# Patient Record
Sex: FEMALE | Race: WHITE | NOT HISPANIC OR LATINO | Employment: FULL TIME | URBAN - METROPOLITAN AREA
[De-identification: names, ages, dates, MRNs, and addresses within clinical notes are randomized per-mention and may not be internally consistent; named-entity substitution may affect disease eponyms.]

---

## 2021-08-03 ENCOUNTER — CLINICAL SUPPORT (OUTPATIENT)
Dept: OTHER | Facility: HOSPITAL | Age: 31
End: 2021-08-03

## 2021-08-03 ENCOUNTER — LAB (OUTPATIENT)
Dept: LAB | Facility: HOSPITAL | Age: 31
End: 2021-08-03

## 2021-08-03 DIAGNOSIS — Z80.8 FAMILY HISTORY OF MALIGNANT NEOPLASM OF THYROID: ICD-10-CM

## 2021-08-03 DIAGNOSIS — Z83.49 FAMILY HISTORY OF THYROID NODULE: ICD-10-CM

## 2021-08-03 DIAGNOSIS — Z13.79 GENETIC TESTING: Primary | ICD-10-CM

## 2021-08-03 DIAGNOSIS — Z80.0 FAMILY HISTORY OF PANCREATIC CANCER: ICD-10-CM

## 2021-08-03 DIAGNOSIS — Z80.0: ICD-10-CM

## 2021-08-03 DIAGNOSIS — Z80.0 FAMILY HISTORY OF COLON CANCER: ICD-10-CM

## 2021-08-03 PROCEDURE — 96040: CPT | Performed by: GENETIC COUNSELOR, MS

## 2021-08-03 NOTE — PROGRESS NOTES
Nereyda Marin, a 30-year-old female, was referred for genetic counseling due to a suspected family history of a mutation in the RET gene. The patient's sister was previously seen for the same referral and it was determined no family members have actually had genetic testing up to this point. Ms. Marin's sister consented to me sharing family and medical history with Ms. Marin to aid in risk assessment and guide our discussion. Ms. Marin has no personal cancer history. Based on her interest in being evaluated for hereditary risk for cancer, Ms. Marin opted to pursue a multi-gene panel. Therefore, the Multi-Cancer Panel through [facility] was ordered, which evaluates 84 genes associated with an increased risk for cancer. Results are expected in 2-3 weeks.     PERTINENT FAMILY HISTORY: (See attached pedigree)   Maternal aunt:   Pancreatic cancer, 61  Maternal uncle:  Metastatic thyroid cancer (unknown type), late 30s  Maternal uncle: Thyroid cancer (unknown type), 40s  Maternal uncle:  Leukemia, 40s     Bile duct (liver) cancer, 57  Maternal grandmother:  Colon cancer, 70s  Mother:   Thyroid nodules  Maternal aunt:  Thyroid removed/unconfirmed cancer  Maternal aunt:   Thyroid nodules    We do not have medical records regarding any of these diagnoses.  Ms. Marin’s relatives with cancer have all declined genetic testing up to this point.     RISK ASSESSMENT:  Ms. Marin’s family history raises concern for a hereditary cancer syndrome. NCCN guidelines state that any individual with a family history of 3 or more relatives with a Bergman syndrome-related cancer may be considered for genetic testing. Ms. Marin’s maternal grandmother with colon cancer, maternal aunt with pancreatic cancer, and maternal uncle with a biliary tract cancer indicate she would meet criteria for this testing. Ms. Marin also meets criteria for testing for BRCA1/2 due to her family history of pancreatic cancer. These risk assessments are based on the  family history information provided at the time of the appointment and could change in the future should new information be obtained.    GENETIC COUNSELING (30 minutes):  We reviewed the family history information in detail. Cases of cancer follow three general patterns: sporadic, familial, and hereditary.  While most cancer is sporadic, some cases appear to occur in family clusters.  These cases are said to be familial and account for 10-20% of cancer cases.  Familial cases may be due to a combination of shared genes and environmental factors among family members.  In even fewer families, the cancer is said to be inherited, and the genes responsible for the cancer are known.      Family histories typical of hereditary cancer syndromes usually include multiple first- and second-degree relatives diagnosed with cancer types that define a syndrome.  These cases tend to be diagnosed at younger-than-expected ages and can be bilateral or multifocal.  The cancer in these families follows an autosomal dominant inheritance pattern, which indicates the likely presence of a mutation in a cancer susceptibility gene.  Children and siblings of an individual believed to carry this mutation have a 50% chance of inheriting that mutation, thereby inheriting the increased risk to develop cancer.  These mutations can be passed down from the maternal or the paternal lineage.    Given the family history of thyroid cancer, concern had been raised for Multiple Endocrine Neoplasia Type 2 (MEN2).  MEN2 is classified into three subtypes based on the additional features identified within a family. All three subtypes are associated with an increased risk for medullary thyroid cancer. Individuals with MEN2A have an approximately 95% lifetime risk of MTC with the typical age of onset in early adulthood. MEN2A is also associated with an approximate 50% lifetime risk for pheochromocytomas, which usually present with intractable hypertension.  They  can be bilateral and have about a 4% risk for malignant transformation. Finally, 20-30% of patients with MEN2A develop parathyroid disease.  Parathyroid abnormalities can range from benign parathyroid adenomas to clinically evident hyperparathyroidism with hypercalcemia and renal stones. MEN2 is caused by mutations within the RET moreno-oncogene.  Genotype/phenotype correlations do exist (i.e. particular mutations within the RET gene are associated with particular risks) and may be used for specific risk assessment.  Prophylactic thyroidectomy is the primary preventative measure for MEN2.  Annual biochemical screening for pheochromocytomas and parathyroid disease is also recommended in affected individuals.  Regardless, screening for pheochromocytomas is recommended prior to any surgery due to the risk an undetected pheochromocytoma would pose for an anesthesia-induced hypertensive crisis.    There are other genes that are known to be associated with an increased risk for cancer.  For example, Ms. Marin meets criteria for both Bergman syndrome and Hereditary Breast and Ovarian Cancer (HBOC). If Ms. Marin is found to have a mutation in a gene associated with either one of these syndromes, then there are national management guidelines to follow for various screening and management recommendations. The recommendations and degree of risk vary based on the underlying genetic mutation.  We discussed there are other genes associated with increased risk for these other cancers we see presenting in her family. Based on Ms. Marin’s desire to get as much information as possible regarding her personal risks and potential risks for her family, she opted to pursue testing through a panel that would look at several other genes known to increase the risk for cancer.    GENETIC TESTING:  The risks, benefits and limitations of genetic testing and implications for clinical management following testing were reviewed.  DNA test results can  influence decisions regarding screening, prevention and surgical management.  Genetic testing can have significant psychological implications for both individuals and families.  Also discussed was the possibility of employment and insurance discrimination based on genetic test results and the laws in place to prevent this.    We discussed panel testing, which would involve testing for RET, as well as 83 additional genes that are associated with increased cancer risk. The benefits and limitations of genetic testing were discussed and Ms. Marin decided to pursue testing via the panel. The implications of a positive or negative test result were discussed. We discussed the possibility that, in some cases, genetic test results may be uninformative or may be ambiguous due to the identification of a genetic variant. These variants may or may not be associated with an increased cancer risk.  With multigene panel testing, it is not uncommon for a variant of uncertain significance (VUS) to be identified.  If a VUS is identified, testing family members is typically not recommended and screening recommendations are made based on the family history.  The laboratories that perform genetic testing work to reclassify the VUS and send out an amended report if and when a VUS is reclassified.  The majority of variant findings are ultimately reclassified to a negative result.  Given Ms. Marin’s family history, a negative test result would not eliminate the chance that there is a genetic mutation being passed through the family that she did not inherit. If a family member affected by cancer has genetic testing performed, it may help to interpret the results of Ms. Marin’s genetic testing (i.e. if a family member tests positive, a negative result for Ms. Marin would be a true negative.)     PLAN: Genetic testing via the Multi-Cancer Panel through [facility] was ordered and results are expected in 2-3 weeks.  Ms. Marin is welcome to  contact us in the meantime at [phone] with any questions she may have.      Nayana Shah MS  Genetic Counselor

## 2021-08-11 ENCOUNTER — DOCUMENTATION (OUTPATIENT)
Dept: OTHER | Facility: CLINIC | Age: 31
End: 2021-08-11

## 2021-08-11 NOTE — PROGRESS NOTES
Nereyda Marin, a 30-year-old female, was referred for genetic counseling due to a suspected family history of a mutation in the RET gene. The patient's sister was previously seen for the same referral and it was determined no family members have actually had genetic testing up to this point. Ms. Marin's sister consented to me sharing family and medical history with Ms. Marin to aid in risk assessment and guide our discussion. Ms. Marin has no personal cancer history. Based on her interest in being evaluated for hereditary risk for cancer, Ms. Marin opted to pursue a multi-gene panel. Therefore, the Multi-Cancer Panel through [facility] was ordered, which evaluates 84 genes associated with an increased risk for cancer. Testing was negative for any known pathogenic/deleterious mutations in the 84 genes analyzed.  While no deleterious mutations were identified, one variant of uncertain significance (VUS) was identified in the POLE gene. Variants are differences in DNA that may or may not affect the function of the gene. The classification of a variant to either a benign variant or deleterious mutation depends on a number of factors. The majority (estimated to be >90%) of VUS’s are eventually reclassified as benign variation, therefore this result should not be used in making medical management decisions. It is not recommended that unaffected relatives be tested for a VUS. ClinVar, the NIH variant classification database, was referenced and there are no alternative interpretations of the POLE variant at this time. Results were discussed via telephone on 08/11/2021.     PERTINENT FAMILY HISTORY: (See attached pedigree)   Maternal aunt:             Pancreatic cancer, 61  Maternal uncle:           Metastatic thyroid cancer (unknown type), late 30s  Maternal uncle:           Thyroid cancer (unknown type), 40s  Maternal uncle:           Leukemia, 40s                                      Bile duct (liver) cancer,  57  Maternal grandmother:  Colon cancer, 70s  Mother:                        Thyroid nodules  Maternal aunt:             Thyroid removed/unconfirmed cancer  Maternal aunt:             Thyroid nodules     We do not have medical records regarding any of these diagnoses.  Ms. Marin’s relatives with cancer have all declined genetic testing up to this point.      RISK ASSESSMENT:  Ms. Marin’s family history raises concern for a hereditary cancer syndrome. NCCN guidelines state that any individual with a family history of 3 or more relatives with a Bergman syndrome-related cancer may be considered for genetic testing. Ms. Marin’s maternal grandmother with colon cancer, maternal aunt with pancreatic cancer, and maternal uncle with a biliary tract cancer indicate she would meet criteria for this testing. Ms. Marin also meets criteria for testing for BRCA1/2 due to her family history of pancreatic cancer. These risk assessments are based on the family history information provided at the time of the appointment and could change in the future should new information be obtained.     GENETIC COUNSELING (30 minutes):  We reviewed the family history information in detail. Cases of cancer follow three general patterns: sporadic, familial, and hereditary.  While most cancer is sporadic, some cases appear to occur in family clusters.  These cases are said to be familial and account for 10-20% of cancer cases.  Familial cases may be due to a combination of shared genes and environmental factors among family members.  In even fewer families, the cancer is said to be inherited, and the genes responsible for the cancer are known.       Family histories typical of hereditary cancer syndromes usually include multiple first- and second-degree relatives diagnosed with cancer types that define a syndrome.  These cases tend to be diagnosed at younger-than-expected ages and can be bilateral or multifocal.  The cancer in these families follows an  autosomal dominant inheritance pattern, which indicates the likely presence of a mutation in a cancer susceptibility gene.  Children and siblings of an individual believed to carry this mutation have a 50% chance of inheriting that mutation, thereby inheriting the increased risk to develop cancer.  These mutations can be passed down from the maternal or the paternal lineage.     Given the family history of thyroid cancer, concern had been raised for Multiple Endocrine Neoplasia Type 2 (MEN2).  MEN2 is classified into three subtypes based on the additional features identified within a family. All three subtypes are associated with an increased risk for medullary thyroid cancer. Individuals with MEN2A have an approximately 95% lifetime risk of MTC with the typical age of onset in early adulthood. MEN2A is also associated with an approximate 50% lifetime risk for pheochromocytomas, which usually present with intractable hypertension.  They can be bilateral and have about a 4% risk for malignant transformation. Finally, 20-30% of patients with MEN2A develop parathyroid disease.  Parathyroid abnormalities can range from benign parathyroid adenomas to clinically evident hyperparathyroidism with hypercalcemia and renal stones. MEN2 is caused by mutations within the RET moreno-oncogene.  Genotype/phenotype correlations do exist (i.e. particular mutations within the RET gene are associated with particular risks) and may be used for specific risk assessment.  Prophylactic thyroidectomy is the primary preventative measure for MEN2.  Annual biochemical screening for pheochromocytomas and parathyroid disease is also recommended in affected individuals.  Regardless, screening for pheochromocytomas is recommended prior to any surgery due to the risk an undetected pheochromocytoma would pose for an anesthesia-induced hypertensive crisis.     There are other genes that are known to be associated with an increased risk for cancer.   For example, Ms. Marin meets criteria for both Bergman syndrome and Hereditary Breast and Ovarian Cancer (HBOC). If Ms. Marin is found to have a mutation in a gene associated with either one of these syndromes, then there are national management guidelines to follow for various screening and management recommendations. The recommendations and degree of risk vary based on the underlying genetic mutation.  We discussed there are other genes associated with increased risk for these other cancers we see presenting in her family. Based on Ms. Marin’s desire to get as much information as possible regarding her personal risks and potential risks for her family, she opted to pursue testing through a panel that would look at several other genes known to increase the risk for cancer.     GENETIC TESTING:  The risks, benefits and limitations of genetic testing and implications for clinical management following testing were reviewed.  DNA test results can influence decisions regarding screening, prevention and surgical management.  Genetic testing can have significant psychological implications for both individuals and families.  Also discussed was the possibility of employment and insurance discrimination based on genetic test results and the laws in place to prevent this.     We discussed panel testing, which would involve testing for RET, as well as 83 additional genes that are associated with increased cancer risk. The benefits and limitations of genetic testing were discussed and Ms. Marin decided to pursue testing via the panel. The implications of a positive or negative test result were discussed. We discussed the possibility that, in some cases, genetic test results may be uninformative or may be ambiguous due to the identification of a genetic variant. These variants may or may not be associated with an increased cancer risk.  With multigene panel testing, it is not uncommon for a variant of uncertain significance (VUS)  to be identified.  If a VUS is identified, testing family members is typically not recommended and screening recommendations are made based on the family history.  The laboratories that perform genetic testing work to reclassify the VUS and send out an amended report if and when a VUS is reclassified.  The majority of variant findings are ultimately reclassified to a negative result.  Given Ms. Marin’s family history, a negative test result would not eliminate the chance that there is a genetic mutation being passed through the family that she did not inherit. If a family member affected by cancer has genetic testing performed, it may help to interpret the results of Ms. Marin’s genetic testing (i.e. if a family member tests positive, a negative result for Ms. Marin would be a true negative.)      TEST RESULTS:  Genetic testing was negative for known pathogenic mutations by sequencing and rearrangement testing for the 84 genes on this panel. A variant of uncertain significance (VUS) was identified in the POLE gene. VUSs are differences within a gene that may or may not be of clinical significance.  VUSs are frequently reported with multigene panel testing, given the number of genes being evaluated.  The majority of VUS’s are ultimately reclassified as benign, therefore these results should not be used in making management decisions.  If the VUS is ever reclassified a new report will be issued by the laboratory and released directly to the ordering physician, and our office. Since an affected relative has not had genetic testing, it is possible that there is a mutation present in the family that Ms. Marin did not happen to inherit. Testing could still be considered for any of her family members who have cancer diagnoses.  This assessment is based on the information provided at the time of the consultation.    PLAN: Genetic counseling remains available to Ms. Marin and her family. If she has any questions or concerns  in the future, she is free to contact me at [phone].        Nayana Shah MS  Genetic Counselor

## 2023-04-05 ENCOUNTER — OFFICE VISIT (OUTPATIENT)
Dept: FAMILY MEDICINE CLINIC | Facility: CLINIC | Age: 33
End: 2023-04-05
Payer: COMMERCIAL

## 2023-04-05 VITALS
HEIGHT: 63 IN | RESPIRATION RATE: 16 BRPM | SYSTOLIC BLOOD PRESSURE: 108 MMHG | WEIGHT: 226.8 LBS | OXYGEN SATURATION: 97 % | BODY MASS INDEX: 40.18 KG/M2 | TEMPERATURE: 97.6 F | HEART RATE: 80 BPM | DIASTOLIC BLOOD PRESSURE: 76 MMHG

## 2023-04-05 DIAGNOSIS — Z00.00 WELL ADULT EXAM: Primary | ICD-10-CM

## 2023-04-05 DIAGNOSIS — B00.1 HERPES LABIALIS: ICD-10-CM

## 2023-04-05 DIAGNOSIS — J45.20 MILD INTERMITTENT ASTHMA WITHOUT COMPLICATION: ICD-10-CM

## 2023-04-05 DIAGNOSIS — R12 HEARTBURN: ICD-10-CM

## 2023-04-05 DIAGNOSIS — F41.9 ANXIETY: ICD-10-CM

## 2023-04-05 PROCEDURE — 99395 PREV VISIT EST AGE 18-39: CPT | Performed by: FAMILY MEDICINE

## 2023-04-05 RX ORDER — FLUTICASONE FUROATE AND VILANTEROL 200; 25 UG/1; UG/1
POWDER RESPIRATORY (INHALATION)
COMMUNITY

## 2023-04-05 RX ORDER — VITAMIN A ACETATE, BETA CAROTENE, ASCORBIC ACID, CHOLECALCIFEROL, .ALPHA.-TOCOPHEROL ACETATE, DL-, THIAMINE MONONITRATE, RIBOFLAVIN, NIACINAMIDE, PYRIDOXINE HYDROCHLORIDE, FOLIC ACID, CYANOCOBALAMIN, CALCIUM CARBONATE, FERROUS FUMARATE, ZINC OXIDE, CUPRIC OXIDE 3080; 12; 120; 400; 1; 1.84; 3; 20; 22; 920; 25; 200; 27; 10; 2 [IU]/1; UG/1; MG/1; [IU]/1; MG/1; MG/1; MG/1; MG/1; MG/1; [IU]/1; MG/1; MG/1; MG/1; MG/1; MG/1
TABLET, FILM COATED ORAL DAILY
COMMUNITY

## 2023-04-05 RX ORDER — VALACYCLOVIR HYDROCHLORIDE 1 G/1
TABLET, FILM COATED ORAL
Qty: 20 TABLET | Refills: 1 | Status: SHIPPED | OUTPATIENT
Start: 2023-04-05

## 2023-04-05 RX ORDER — HYDROXYZINE HYDROCHLORIDE 25 MG/1
25 TABLET, FILM COATED ORAL 2 TIMES DAILY PRN
Qty: 30 TABLET | Refills: 0 | Status: SHIPPED | OUTPATIENT
Start: 2023-04-05

## 2023-04-05 RX ORDER — PANTOPRAZOLE SODIUM 40 MG/1
40 TABLET, DELAYED RELEASE ORAL DAILY
Qty: 90 TABLET | Refills: 0 | Status: SHIPPED | OUTPATIENT
Start: 2023-04-05

## 2023-04-05 NOTE — PATIENT INSTRUCTIONS
Continue your current medications.   Aim for 150 minutes of aerobic exercise weekly.   Take the pantoprazole daily for 6 weeks. Call if heartburn persists or recurs.  Use the hydroxyzine as needed for anxiety.

## 2023-04-05 NOTE — PROGRESS NOTES
Chief Complaint  Annual Exam    Subjective    History of Present Illness {CC  Problem List  Visit  Diagnosis   Encounters  Notes  Medications  Labs  Result Review Imaging  Media :23}     Nereyda Marin presents to [facility] for Annual Exam.  She is  and lives at home with her  and 2 kids, ages 3 and 1. She works full time as  with [employer]. She sees the gynecologist for her pap smears. Her pap smears have always been normal. Her last labs were 8/2022 and were normal except mildly elevated LDL (105).    Their chronic medical conditions were reviewed and are stable unless noted otherwise below. Her asthma and heartburn improved during pregnancy. She had covid about a month ago and since then her heartburn and asthma have been more frequent. She did well on pantoprazole in the past but is out and is requesting a refill. She does report intermittent anxiety with nausea. She has taken hydroxyzine and ondansetron in the past for this.     History of Present Illness     Past Medical History:   Diagnosis Date   • Acute bronchitis    • Acute sinusitis    • Allergic rhinitis    • Anxiety    • Asthma    • Candidal stomatitis    • Cough    • COVID-19    • Enlarged lymph nodes    • Heartburn    • Herpesviral vesicular dermatitis    • Nausea    • Other fatigue    • Sore throat         Past Surgical History:   Procedure Laterality Date   • ESOPHAGEAL DILATATION            Current Outpatient Medications:   •  Fluticasone Furoate-Vilanterol (BREO ELLIPTA) 200-25 MCG/ACT inhaler, , Disp: , Rfl:   •  prenatal vitamins (PRENATAL 27-1) 27-1 MG tablet tablet, Take  by mouth Daily., Disp: , Rfl:   •  valACYclovir (Valtrex) 1000 MG tablet, Two tabs PO Q12 hours for two doses, Disp: 20 tablet, Rfl: 1  •  hydrOXYzine (ATARAX) 25 MG tablet, Take 1 tablet by mouth 2 (Two) Times a Day As Needed for Anxiety., Disp: 30 tablet, Rfl: 0  •  pantoprazole (PROTONIX) 40  "MG EC tablet, Take 1 tablet by mouth Daily., Disp: 90 tablet, Rfl: 0     Allergies   Allergen Reactions   • Corn Unknown - Low Severity   • Milk-Related Compounds Unknown - Low Severity   • Sesame Oil Unknown - Low Severity   • Vanilla Unknown - Low Severity        Family History   Problem Relation Age of Onset   • Thyroid disease Mother    • Hypertension Mother    • Hyperlipidemia Father    • Crohn's disease Paternal Grandmother    • Heart disease Paternal Grandfather    • Diabetes type II Other    • Stroke Other    • Heart disease Other         Social History     Socioeconomic History   • Marital status:    Tobacco Use   • Smoking status: Never   • Smokeless tobacco: Never   Vaping Use   • Vaping Use: Never used   Substance and Sexual Activity   • Alcohol use: Yes     Alcohol/week: 3.0 standard drinks     Types: 3 Standard drinks or equivalent per week   • Drug use: Never   • Sexual activity: Defer        Health Maintenance   Topic Date Due   • COVID-19 Vaccine (1) Never done   • TDAP/TD VACCINES (2 - Tdap) 07/22/2003   • HEPATITIS C SCREENING  Never done   • ANNUAL PHYSICAL  Never done   • PAP SMEAR  Never done   • INFLUENZA VACCINE  08/01/2023   • Pneumococcal Vaccine 0-64  Aged Out        Review of Systems   Constitutional: Negative for fatigue.   HENT: Negative for ear pain and sinus pain.    Respiratory: Negative for cough and shortness of breath.    Cardiovascular: Negative for chest pain.   Gastrointestinal: Positive for abdominal pain (increased heartburn).   Genitourinary: Negative for dysuria.   Musculoskeletal: Negative for arthralgias.   Skin: Negative for color change and rash.   Neurological: Negative for dizziness and headaches.   Psychiatric/Behavioral: Negative for dysphoric mood.        Objective       Vital Signs:   /76   Pulse 80   Temp 97.6 °F (36.4 °C) (Oral)   Resp 16   Ht 160 cm (63\")   Wt 103 kg (226 lb 12.8 oz)   SpO2 97%   BMI 40.18 kg/m²     Body mass index is 40.18 " kg/m².     PHQ-9 Depression Screening  Little interest or pleasure in doing things? 0-->not at all   Feeling down, depressed, or hopeless? 0-->not at all   Trouble falling or staying asleep, or sleeping too much?     Feeling tired or having little energy?     Poor appetite or overeating?     Feeling bad about yourself - or that you are a failure or have let yourself or your family down?     Trouble concentrating on things, such as reading the newspaper or watching television?     Moving or speaking so slowly that other people could have noticed? Or the opposite - being so fidgety or restless that you have been moving around a lot more than usual?     Thoughts that you would be better off dead, or of hurting yourself in some way?     PHQ-9 Total Score 0   If you checked off any problems, how difficult have these problems made it for you to do your work, take care of things at home, or get along with other people?           Physical Exam  Constitutional:       General: She is not in acute distress.  HENT:      Right Ear: Tympanic membrane normal.      Left Ear: Tympanic membrane normal.      Mouth/Throat:      Mouth: Mucous membranes are moist.      Pharynx: No posterior oropharyngeal erythema.   Eyes:      Extraocular Movements: Extraocular movements intact.      Conjunctiva/sclera: Conjunctivae normal.   Cardiovascular:      Rate and Rhythm: Normal rate and regular rhythm.      Heart sounds: No murmur heard.  Pulmonary:      Effort: No respiratory distress.      Breath sounds: Normal breath sounds.   Abdominal:      General: There is no distension.      Palpations: Abdomen is soft.      Tenderness: There is no abdominal tenderness.   Musculoskeletal:         General: No swelling. Normal range of motion.      Cervical back: No tenderness.   Lymphadenopathy:      Cervical: No cervical adenopathy.   Skin:     General: Skin is warm.      Findings: No rash.   Neurological:      General: No focal deficit present.       Mental Status: She is alert.   Psychiatric:         Mood and Affect: Mood normal.         Behavior: Behavior normal.          Result Review  Data Reviewed:{ Labs  Result Review  Imaging  Med Tab  Media :23}                Assessment and Plan {CC Problem List  Visit Diagnosis  ROS  Review (Popup)  Health Maintenance  Quality  BestPractice  Medications  SmartSets  SnapShot Encounters  Media :23}   Diagnoses and all orders for this visit:    1. Well adult exam (Primary)    2. Herpes labialis  -     valACYclovir (Valtrex) 1000 MG tablet; Two tabs PO Q12 hours for two doses  Dispense: 20 tablet; Refill: 1    3. Anxiety    4. Heartburn    5. Mild intermittent asthma without complication    Other orders  -     pantoprazole (PROTONIX) 40 MG EC tablet; Take 1 tablet by mouth Daily.  Dispense: 90 tablet; Refill: 0  -     hydrOXYzine (ATARAX) 25 MG tablet; Take 1 tablet by mouth 2 (Two) Times a Day As Needed for Anxiety.  Dispense: 30 tablet; Refill: 0        Patient Instructions   Continue your current medications.   Aim for 150 minutes of aerobic exercise weekly.   Take the pantoprazole daily for 6 weeks. Call if heartburn persists or recurs.  Use the hydroxyzine as needed for anxiety.     Routine health maintenance, immunizations and cancer screenings were discussed and encouraged.  Patient was given instructions and counseling regarding her condition or for health maintenance advice on the AVS.       Return in about 1 year (around 4/5/2024) for Annual.    Annabelle Bueno MD

## 2023-08-16 ENCOUNTER — OFFICE VISIT (OUTPATIENT)
Dept: FAMILY MEDICINE CLINIC | Facility: CLINIC | Age: 33
End: 2023-08-16
Payer: COMMERCIAL

## 2023-08-16 VITALS
WEIGHT: 228.5 LBS | BODY MASS INDEX: 40.49 KG/M2 | OXYGEN SATURATION: 99 % | DIASTOLIC BLOOD PRESSURE: 73 MMHG | SYSTOLIC BLOOD PRESSURE: 103 MMHG | HEART RATE: 85 BPM | HEIGHT: 63 IN

## 2023-08-16 DIAGNOSIS — H10.31 ACUTE CONJUNCTIVITIS OF RIGHT EYE, UNSPECIFIED ACUTE CONJUNCTIVITIS TYPE: Primary | ICD-10-CM

## 2023-08-16 DIAGNOSIS — J01.00 ACUTE NON-RECURRENT MAXILLARY SINUSITIS: ICD-10-CM

## 2023-08-16 DIAGNOSIS — E66.01 OBESITY, MORBID, BMI 40.0-49.9: ICD-10-CM

## 2023-08-16 RX ORDER — AMOXICILLIN AND CLAVULANATE POTASSIUM 875; 125 MG/1; MG/1
1 TABLET, FILM COATED ORAL EVERY 12 HOURS SCHEDULED
Qty: 20 TABLET | Refills: 0 | Status: SHIPPED | OUTPATIENT
Start: 2023-08-16 | End: 2023-08-26

## 2023-08-16 RX ORDER — MOXIFLOXACIN 5 MG/ML
1 SOLUTION/ DROPS OPHTHALMIC 3 TIMES DAILY
Qty: 3 ML | Refills: 0 | Status: SHIPPED | OUTPATIENT
Start: 2023-08-16

## 2023-08-16 NOTE — PROGRESS NOTES
"Chief Complaint  Sinusitis (Neg for covid 3 times), Conjunctivitis (Since Saturday, [facility] clinic on Sunday said it was a clogged duct, no improvement ), and Sore Throat (Neg strep Sunday )    Subjective    History of Present Illness {CC  Problem List  Visit  Diagnosis   Encounters  Notes  Medications  Labs  Result Review Imaging  Media :23}     Nereyda Marin presents to [facility] for Sinusitis (Neg for covid 3 times), Conjunctivitis (Since Saturday, [facility] clinic on Sunday said it was a clogged duct, no improvement ), and Sore Throat (Neg strep Sunday ).  History of Present Illness   She presents with 1 week history of right eye pain and irritation.  It began with increased drainage and then progressed to redness and swelling.  She was seen in the [facility] over the weekend and was told clogged tear duct and to use warm compresses. She has been doing this and is no better.  The past 3 to 4 days she has also been experiencing increased sinus pain, sore throat, and cough.  She is tested for COVID 3 times since her symptoms again all were negative.  She was also tested for strep throat at the [facility] that was also negative.    She would also like to discuss weight gain.  She has struggled with weight and been up and down with various diets for many years.  She has recently been considering Wegovy and would be interested in trying this if appropriate.    Objective     Vital Signs:   /73   Pulse 85   Ht 160 cm (63\")   Wt 104 kg (228 lb 8 oz)   SpO2 99%   BMI 40.48 kg/mý   Body mass index is 40.48 kg/mý.     Physical Exam  Constitutional:       General: She is not in acute distress.  HENT:      Right Ear: Tympanic membrane and external ear normal.      Left Ear: Tympanic membrane and external ear normal.   Eyes:      Extraocular Movements: Extraocular movements intact.      Conjunctiva/sclera:      Right eye: Right conjunctiva is injected. Exudate " present.      Left eye: Left conjunctiva is not injected. No exudate.  Cardiovascular:      Rate and Rhythm: Normal rate and regular rhythm.      Heart sounds: No murmur heard.  Pulmonary:      Effort: No respiratory distress.      Breath sounds: Normal breath sounds.   Neurological:      General: No focal deficit present.      Mental Status: She is alert.   Psychiatric:         Behavior: Behavior normal.        Result Review  Data Reviewed:{ Labs  Result Review  Imaging  Med Tab  Media :23}                Assessment and Plan {CC Problem List  Visit Diagnosis  ROS  Review (Popup)  Health Maintenance  Quality  BestPractice  Medications  SmartSets  SnapShot Encounters  Media :23}   Diagnoses and all orders for this visit:    1. Acute conjunctivitis of right eye, unspecified acute conjunctivitis type (Primary)    2. Acute non-recurrent maxillary sinusitis    3. Obesity, morbid, BMI 40.0-49.9    Other orders  -     moxifloxacin (Vigamox) 0.5 % ophthalmic solution; Administer 0.05 mL to both eyes 3 (Three) Times a Day.  Dispense: 3 mL; Refill: 0  -     amoxicillin-clavulanate (AUGMENTIN) 875-125 MG per tablet; Take 1 tablet by mouth Every 12 (Twelve) Hours for 10 days.  Dispense: 20 tablet; Refill: 0        Patient Instructions   Use warm compress and use vigamox eye drop 3 times daily for 1 week.  Take antibiotic as directed.  For allergies, you can use antihistamine + zaditor or pataday eye drops.  Consider trial of wegovy when no longer on back order. Check back in 3 months.     We discussed that Wegovy could be a good option for her.  Of course this would need to be done in conjunction with a healthy diet and exercise as well.  Since there is still issues getting the lower dosages of ago we will going to wait a couple of months and then hopefully be able to start this.  We will see her back 3 months after starting the medication.  Patient was given instructions and counseling regarding her condition  or for health maintenance advice on the AVS.       No follow-ups on file.    Annabelle Bueno MD

## 2023-08-16 NOTE — PATIENT INSTRUCTIONS
Use warm compress and use vigamox eye drop 3 times daily for 1 week.  Take antibiotic as directed.  For allergies, you can use antihistamine + zaditor or pataday eye drops.  Consider trial of wegovy when no longer on back order. Check back in 3 months.

## 2024-02-26 ENCOUNTER — TELEPHONE (OUTPATIENT)
Dept: FAMILY MEDICINE CLINIC | Facility: CLINIC | Age: 34
End: 2024-02-26
Payer: COMMERCIAL

## 2024-02-26 NOTE — TELEPHONE ENCOUNTER
Caller: Nereyda Marin    Relationship: Self    Best call back number: [phone]    Who are you requesting to speak with (clinical staff, provider,  specific staff member): DR. SALAZAR OR MA    What was the call regarding: STATED THAT THEY HAD MET WITH DR. SALAZAR ABOUT WEIGHTLOSS MEDICATIONS. STATED THAT THEY WERE NOT ABLE TO GET THE OZEMPIC, WHICH WAS DISCUSSED MOST, AT THE TIME. STATED THAT THEY WOULD LIKE TO KNOW IF THEY ARE ABLE TO GO ON AND GET THE MEDICATION NOW OR IF THEY WOULD NEED ANOTHER APPOINTMENT TO DISCUSS PLEASE CALL AND ADVISE

## 2024-03-10 NOTE — TELEPHONE ENCOUNTER
Unfortunately the availability with wegovy does not seem to be getting any better yet. Zepbound is another similar medication for weight loss. I can send the starting dose now and we can also discuss further at your visit next month.

## 2024-03-11 ENCOUNTER — OFFICE VISIT (OUTPATIENT)
Dept: FAMILY MEDICINE CLINIC | Facility: CLINIC | Age: 34
End: 2024-03-11
Payer: COMMERCIAL

## 2024-03-11 VITALS
TEMPERATURE: 99.8 F | HEART RATE: 118 BPM | DIASTOLIC BLOOD PRESSURE: 76 MMHG | OXYGEN SATURATION: 98 % | SYSTOLIC BLOOD PRESSURE: 113 MMHG | BODY MASS INDEX: 41.29 KG/M2 | HEIGHT: 63 IN | WEIGHT: 233 LBS

## 2024-03-11 DIAGNOSIS — J11.1 INFLUENZA: ICD-10-CM

## 2024-03-11 DIAGNOSIS — J06.9 ACUTE URI: Primary | ICD-10-CM

## 2024-03-11 PROBLEM — J45.909 ASTHMA DURING PREGNANCY: Status: RESOLVED | Noted: 2019-02-11 | Resolved: 2024-03-11

## 2024-03-11 PROBLEM — O99.519 ASTHMA DURING PREGNANCY: Status: RESOLVED | Noted: 2019-02-11 | Resolved: 2024-03-11

## 2024-03-11 PROBLEM — Z34.90 PREGNANCY: Status: RESOLVED | Noted: 2019-07-15 | Resolved: 2024-03-11

## 2024-03-11 PROBLEM — Z3A.39 39 WEEKS GESTATION OF PREGNANCY: Status: RESOLVED | Noted: 2019-07-15 | Resolved: 2024-03-11

## 2024-03-11 PROBLEM — O26.899 HEARTBURN IN PREGNANCY: Status: RESOLVED | Noted: 2019-02-11 | Resolved: 2024-03-11

## 2024-03-11 PROBLEM — R12 HEARTBURN IN PREGNANCY: Status: RESOLVED | Noted: 2019-02-11 | Resolved: 2024-03-11

## 2024-03-11 PROBLEM — R10.11 RUQ PAIN: Status: RESOLVED | Noted: 2019-02-11 | Resolved: 2024-03-11

## 2024-03-11 PROBLEM — Z34.00 SUPERVISION OF NORMAL FIRST PREGNANCY: Status: RESOLVED | Noted: 2019-02-11 | Resolved: 2024-03-11

## 2024-03-11 PROBLEM — O42.90 RUPTURE OF MEMBRANES WITH DELAY OF DELIVERY: Status: RESOLVED | Noted: 2019-07-15 | Resolved: 2024-03-11

## 2024-03-11 LAB
EXPIRATION DATE: ABNORMAL
EXPIRATION DATE: NORMAL
FLUAV AG NPH QL: POSITIVE
FLUBV AG NPH QL: NEGATIVE
INTERNAL CONTROL: ABNORMAL
INTERNAL CONTROL: NORMAL
Lab: ABNORMAL
Lab: NORMAL
SARS-COV-2 AG UPPER RESP QL IA.RAPID: NOT DETECTED

## 2024-03-11 RX ORDER — BENZONATATE 100 MG/1
100 CAPSULE ORAL 3 TIMES DAILY PRN
Qty: 30 CAPSULE | Refills: 0 | Status: SHIPPED | OUTPATIENT
Start: 2024-03-11

## 2024-03-11 RX ORDER — OSELTAMIVIR PHOSPHATE 75 MG/1
75 CAPSULE ORAL 2 TIMES DAILY
Qty: 10 CAPSULE | Refills: 0 | Status: SHIPPED | OUTPATIENT
Start: 2024-03-11

## 2024-03-11 RX ORDER — ONDANSETRON 4 MG/1
4 TABLET, ORALLY DISINTEGRATING ORAL EVERY 8 HOURS PRN
Qty: 10 TABLET | Refills: 0 | Status: SHIPPED | OUTPATIENT
Start: 2024-03-11

## 2024-03-11 NOTE — PROGRESS NOTES
"Tessalon  Chief Complaint  Chief Complaint   Patient presents with    Pain     Stomach pain and chills, Saturday/ cough Sunday   Diarrhea        History of Present Illness  Nereyda Marin is a 33 y.o. female presents to [facility] with 3 days GI and upper respiratory symptoms.   Onset was sudden  There is  facial pain- forehead and maxillary, L > R  There is no ear pain.  There is no loss of taste or smell.  There is no  pain with chewing.  There is no dental pain.  There is nasal drainage/congestion.  There  is cough. Cough is all the time, wet, barky  There is no sore throat.  There is headache  There is fever, temp max of 101.7.  There are chills.   There is no  shortness of breath.  There is no  chest pain.  There are body aches.  There is  nausea/vomiting.  There is diarrhea.  There is decreased appetite.    The patient has taken nausea medicine a few nights ago.  The patient does have sick contacts recently. Her 1 yo was sick last week with a viral infection, works at large teen conference.  There has not been recent travel.   The patient is up to date on vaccinations.  The patient does not have any new sexual partners.   The patient does have comorbid pulmonary disease- she does have asthma, has not been needing albuterol inhaler    Objective   Vitals:    03/11/24 1350   BP: 113/76   Pulse: 118   Temp: 99.8 °F (37.7 °C)   SpO2: 98%   Weight: 106 kg (233 lb)   Height: 160 cm (62.99\")        Physical Exam  Constitutional:       General: She is not in acute distress.     Appearance: Normal appearance. She is normal weight. She is ill-appearing. She is not toxic-appearing.   HENT:      Head: Normocephalic and atraumatic.      Right Ear: Ear canal and external ear normal.      Left Ear: Ear canal and external ear normal.      Ears:      Comments: Clear fluid bilat TM, no bulge or erythema     Nose: Nose normal. No congestion or rhinorrhea.      Comments: No frontal sinus " tenderness to percussion; slight bilateral maxillary sinus tenderness to percussion     Mouth/Throat:      Mouth: Mucous membranes are moist.      Pharynx: Posterior oropharyngeal erythema (posterior oropharynx) present. No oropharyngeal exudate.      Comments: + PND  Eyes:      Extraocular Movements: Extraocular movements intact.      Conjunctiva/sclera: Conjunctivae normal.      Pupils: Pupils are equal, round, and reactive to light.   Cardiovascular:      Rate and Rhythm: Normal rate and regular rhythm.   Pulmonary:      Effort: Pulmonary effort is normal. No respiratory distress.      Breath sounds: Normal breath sounds. No wheezing.      Comments: No crackles  Abdominal:      General: Bowel sounds are normal. There is no distension.      Palpations: Abdomen is soft.      Tenderness: There is no abdominal tenderness. There is no guarding or rebound.   Musculoskeletal:         General: Normal range of motion.      Cervical back: Normal range of motion. No rigidity or tenderness.   Lymphadenopathy:      Cervical: No cervical adenopathy.   Skin:     General: Skin is warm and dry.      Capillary Refill: Capillary refill takes less than 2 seconds.      Findings: No rash.   Neurological:      General: No focal deficit present.      Mental Status: She is alert and oriented to person, place, and time.   Psychiatric:         Mood and Affect: Mood normal.         Behavior: Behavior normal.         Thought Content: Thought content normal.         Judgment: Judgment normal.          The following data was reviewed by: Nikky Santo MD on 03/11/2024:  Lab Results   Component Value Date    RAPFLUA Positive (A) 03/11/2024    RAPFLUB Negative 03/11/2024     OCT SARS-CoV-2 Antigen YESSI  Order: [id number]  Status: Final result       Visible to patient: No (scheduled for 3/11/2024  3:35 PM)       Next appt: 04/12/2024 at 11:30 AM in Family Medicine (Annabelle Bueno MD)       Dx: Acute URI    Specimen Information: Swab   0  Result Notes         Component  Ref Range & Units 14:35  (3/11/24) 14:35  (3/11/24) 3 yr ago  (2/21/21) 3 yr ago  (2/21/21)   SARS Antigen  Not Detected, Presumptive Negative Not Detected             Assessment and Plan  Nereyda Marin is a 33 y.o. female presents to [facility] today for URI symptoms and GI symptoms x 3 days. Patient with influenza infection.  Positive swab in clinic. Oxygen saturation greater than 95% on room air. Exam reassuring, no indication for advanced imaging or ER evaluation at this time.   - there are not contraindications to Tamiflu, will order 75mg BID x 5 days  -Tessalon Perles 100 mg 3 times daily as needed cough  - Zofran 4mg ODT q8hr PRN nausea/vomiting  - Patient did not need a note for work/school; recommend 2-3 days off work and masking around family  -AVS with supportive care strategies; encouraged aggressive decongestant with pseudoephedrine and sinus flush to reduce risk of sinus infection  -Discussed usual course of illness, provided ER precautions.  Follow-up as needed.    Diagnoses and all orders for this visit:    1. Acute URI (Primary)  -     POCT Influenza A/B  -     POCT SARS-CoV-2 Antigen YESSI    2. Influenza  -     oseltamivir (Tamiflu) 75 MG capsule; Take 1 capsule by mouth 2 (Two) Times a Day. Indications: Influenza A  Dispense: 10 capsule; Refill: 0  -     benzonatate (Tessalon Perles) 100 MG capsule; Take 1 capsule by mouth 3 (Three) Times a Day As Needed for Cough.  Dispense: 30 capsule; Refill: 0  -     ondansetron ODT (ZOFRAN-ODT) 4 MG disintegrating tablet; Place 1 tablet on the tongue Every 8 (Eight) Hours As Needed for Nausea or Vomiting. Indications: Nausea and Vomiting  Dispense: 10 tablet; Refill: 0        Patient voiced understanding and agreement with plan of care and had no further questions or concerns at this time.     Nikky Santo MD  Family Medicine  [facility]     Follow Up   Return if symptoms  worsen or fail to improve in 5-7 days.    Patient Instructions   Upper respiratory infection plan:  - nasal congestion relief with OTC sudafed, mucinex, sinus rinse, use of steam shower or humidifier  - sore throat relief with OTC cough drops, spoonfuls of honey, salt water gargle, throat coat tea, hot honey lemon water  - cough relief with OTC cough drops, honey, use of steam shower or humidifier; RX tessalon perles  - pain/inflammation relief with OTC ibuprofen 400mg every 4 hrs with food/water, tylenol 1000mg every 6 hrs with food/water  - you may consider taking elderberry syrup or gummies or zinc supplement to boost immune system  - increase hydration  - RX: tamiflu 2x daily for 5 days  - zofran 4mg every 8 hrs as needed for nausea/vomiting  - time off work/school: 2-3 days  - Red flags: new fever that doesn't respond to tylenol/ibuprofen (100.4+), inability to swallow, one sided severe facial pain or dental pain, new shortness of breath, new chest pain not associated with coughing, blood in phlegm             Diagnoses and all orders for this visit:    1. Acute URI (Primary)  -     POCT Influenza A/B  -     POCT SARS-CoV-2 Antigen YESSI    2. Influenza  -     oseltamivir (Tamiflu) 75 MG capsule; Take 1 capsule by mouth 2 (Two) Times a Day. Indications: Influenza A  Dispense: 10 capsule; Refill: 0  -     benzonatate (Tessalon Perles) 100 MG capsule; Take 1 capsule by mouth 3 (Three) Times a Day As Needed for Cough.  Dispense: 30 capsule; Refill: 0  -     ondansetron ODT (ZOFRAN-ODT) 4 MG disintegrating tablet; Place 1 tablet on the tongue Every 8 (Eight) Hours As Needed for Nausea or Vomiting. Indications: Nausea and Vomiting  Dispense: 10 tablet; Refill: 0        Patient voiced understanding and agreement with plan of care and had no further questions or concerns at this time.       Nikky Santo MD  Family Medicine  [facility]      Follow Up  Return if symptoms worsen or fail to improve in 5-7  days.    Patient Instructions   Upper respiratory infection plan:  - nasal congestion relief with OTC sudafed, mucinex, sinus rinse, use of steam shower or humidifier  - sore throat relief with OTC cough drops, spoonfuls of honey, salt water gargle, throat coat tea, hot honey lemon water  - cough relief with OTC cough drops, honey, use of steam shower or humidifier; RX tessalon perles  - pain/inflammation relief with OTC ibuprofen 400mg every 4 hrs with food/water, tylenol 1000mg every 6 hrs with food/water  - you may consider taking elderberry syrup or gummies or zinc supplement to boost immune system  - increase hydration  - RX: tamiflu 2x daily for 5 days  - zofran 4mg every 8 hrs as needed for nausea/vomiting  - time off work/school: 2-3 days  - Red flags: new fever that doesn't respond to tylenol/ibuprofen (100.4+), inability to swallow, one sided severe facial pain or dental pain, new shortness of breath, new chest pain not associated with coughing, blood in phlegm

## 2024-03-11 NOTE — PATIENT INSTRUCTIONS
Upper respiratory infection plan:  - nasal congestion relief with OTC sudafed, mucinex, sinus rinse, use of steam shower or humidifier  - sore throat relief with OTC cough drops, spoonfuls of honey, salt water gargle, throat coat tea, hot honey lemon water  - cough relief with OTC cough drops, honey, use of steam shower or humidifier; RX tessalon perles  - pain/inflammation relief with OTC ibuprofen 400mg every 4 hrs with food/water, tylenol 1000mg every 6 hrs with food/water  - you may consider taking elderberry syrup or gummies or zinc supplement to boost immune system  - increase hydration  - RX: tamiflu 2x daily for 5 days  - zofran 4mg every 8 hrs as needed for nausea/vomiting  - time off work/school: 2-3 days  - Red flags: new fever that doesn't respond to tylenol/ibuprofen (100.4+), inability to swallow, one sided severe facial pain or dental pain, new shortness of breath, new chest pain not associated with coughing, blood in phlegm     No

## 2024-03-13 ENCOUNTER — TELEPHONE (OUTPATIENT)
Dept: FAMILY MEDICINE CLINIC | Facility: CLINIC | Age: 34
End: 2024-03-13
Payer: COMMERCIAL

## 2024-03-13 NOTE — TELEPHONE ENCOUNTER
Caller: Tania Nereyda Jennifer    Relationship: Self    Best call back number: [phone]     What medication are you requesting: ANY    What are your current symptoms: SINUS CONGESTION    How long have you been experiencing symptoms: 03.12.24    Have you had these symptoms before:    [x] Yes  [] No    Have you been treated for these symptoms before:   [x] Yes  [] No    If a prescription is needed, what is your preferred pharmacy and phone number: [id number] [id number] [address] [address] - [phone] [facility] [phone]      Additional notes: PATIENT STATES HER SYMPTOMS HAVE MOVED INTO HER SINUSES, RIGHT SIDE OF FACE. PATIENT IS REQUESTING A PRESCRIPTION.

## 2024-03-13 NOTE — TELEPHONE ENCOUNTER
I recommend nasal saline rinse and can also use flonase and or astelin for nasal congestion.  Allergies are increasing now with the warmer weather.   If not improving with above, should be seen for evaluation.

## 2024-03-14 ENCOUNTER — TELEPHONE (OUTPATIENT)
Dept: FAMILY MEDICINE CLINIC | Facility: CLINIC | Age: 34
End: 2024-03-14
Payer: COMMERCIAL

## 2024-03-14 DIAGNOSIS — J01.00 ACUTE NON-RECURRENT MAXILLARY SINUSITIS: Primary | ICD-10-CM

## 2024-03-14 RX ORDER — AMOXICILLIN AND CLAVULANATE POTASSIUM 875; 125 MG/1; MG/1
1 TABLET, FILM COATED ORAL 2 TIMES DAILY
Qty: 14 TABLET | Refills: 0 | Status: SHIPPED | OUTPATIENT
Start: 2024-03-14 | End: 2024-03-21

## 2024-03-14 RX ORDER — ONDANSETRON 4 MG/1
4 TABLET, FILM COATED ORAL EVERY 8 HOURS PRN
Qty: 30 TABLET | Refills: 0 | Status: SHIPPED | OUTPATIENT
Start: 2024-03-14

## 2024-03-14 NOTE — TELEPHONE ENCOUNTER
Caller: Tania Nereyda Jennifer    Relationship: Self    Best call back number:  [phone]  What is the best time to reach you: ANY     Who are you requesting to speak with (clinical staff, provider,  specific staff member): CLINICAL STAFF       What was the call regarding: PATIENT IS ASKING FOR A CALL BACK.  SHE WAS SEEN  IN OFFICE ON MONDAY BY DR MORENO.  SHE IS NOT IMPROVED WITH TAKING THE TAMAFLU.  SHE FEELS LIKE THIS IS GOING INTO A SINUS INFECTION ALONG WITH LARYNGITIS.  SHE HAS DONE THE TIPS THAT DR MORENO HAS ADVISED .  WOULD LIKE TO KNOW IF THERE IS ANYTHING ELSE THAT SHE CAN TRY AT THIS POINT     I

## 2024-04-12 ENCOUNTER — OFFICE VISIT (OUTPATIENT)
Dept: FAMILY MEDICINE CLINIC | Facility: CLINIC | Age: 34
End: 2024-04-12
Payer: COMMERCIAL

## 2024-04-12 VITALS
BODY MASS INDEX: 39.92 KG/M2 | OXYGEN SATURATION: 99 % | SYSTOLIC BLOOD PRESSURE: 103 MMHG | WEIGHT: 225.3 LBS | DIASTOLIC BLOOD PRESSURE: 71 MMHG | HEIGHT: 63 IN | HEART RATE: 78 BPM

## 2024-04-12 DIAGNOSIS — B00.1 HERPES LABIALIS: ICD-10-CM

## 2024-04-12 DIAGNOSIS — Z11.59 NEED FOR HEPATITIS C SCREENING TEST: ICD-10-CM

## 2024-04-12 DIAGNOSIS — R53.83 FATIGUE, UNSPECIFIED TYPE: ICD-10-CM

## 2024-04-12 DIAGNOSIS — Z00.00 WELL ADULT EXAM: Primary | ICD-10-CM

## 2024-04-12 DIAGNOSIS — E66.9 OBESITY (BMI 30-39.9): ICD-10-CM

## 2024-04-12 DIAGNOSIS — Z13.220 LIPID SCREENING: ICD-10-CM

## 2024-04-12 RX ORDER — VALACYCLOVIR HYDROCHLORIDE 1 G/1
TABLET, FILM COATED ORAL
Qty: 20 TABLET | Refills: 1 | Status: SHIPPED | OUTPATIENT
Start: 2024-04-12

## 2024-04-12 NOTE — PATIENT INSTRUCTIONS
Aim for 150 minutes of aerobic exercise weekly.  Continue routine pap smear with gynecologist.  Plan mammogram starting at 40 and colonoscopy starting at age 45 unless there is a change in personal or family history.  You had lab tests today. You should receive a call or my chart message with your test results. If you have not received your results in the next 7-10 days, please contact the office.    We discussed that for long term success zepbound will likely need to be continued long term. Also if you have a family history of medullary thyroid cancer it would be contraindicated, so please check on that before taking any additional medication.

## 2024-04-12 NOTE — PROGRESS NOTES
Chief Complaint  Annual Exam and Med Refill (valtrex)    Subjective    History of Present Illness {CC  Problem List  Visit  Diagnosis   Encounters  Notes  Medications  Labs  Result Review Imaging  Media :23}     Nereyda Marin presents to [facility] for Annual Exam and Med Refill (valtrex).  She is  and has 2 kids ages 4 and 2. She works for [employer]. She sees the gynecologist routinely for her pap smears.  Her last was 10/2023. They have always been normal. She has never had a mammogram or colonoscopy. She has an Paternal Aunt that had breast cancer and no family history of colon cancer. She exercises infrequently but is active daily. She was a social smoker in college but none since. She is up to date on routine dental and eye exams.     Their chronic medical conditions were reviewed and are stable unless noted otherwise below.    She is also here to discuss obesity.  She started zepbound 4/1. After the first injection she had considerable GI symptoms but is improving.  She is not sure that she will be able to continue due to cost. She also reports that she has a family history of thyroid cancer in aunt/uncles.     History of Present Illness     Social History     Socioeconomic History    Marital status:     Number of children: 2   Tobacco Use    Smoking status: Never    Smokeless tobacco: Never   Vaping Use    Vaping status: Never Used   Substance and Sexual Activity    Alcohol use: Yes     Alcohol/week: 4.0 standard drinks of alcohol     Types: 4 Shots of liquor per week    Drug use: Never    Sexual activity: Yes     Partners: Male        Review of Systems   Constitutional:  Positive for fatigue (mild, more the past few months).   HENT:  Negative for ear pain and sinus pain.    Eyes:  Negative for pain and visual disturbance.   Respiratory:  Negative for cough and shortness of breath.    Cardiovascular:  Negative for chest pain and  "palpitations.   Gastrointestinal:  Negative for abdominal pain, constipation and diarrhea.   Genitourinary:  Negative for dysuria.   Musculoskeletal:  Negative for arthralgias.   Skin:  Negative for color change (sees derm yearly).   Allergic/Immunologic: Positive for environmental allergies (worse in fall).   Neurological:  Positive for headaches (increased a few weeks ago, but has improved now). Negative for dizziness.   Psychiatric/Behavioral:  Negative for dysphoric mood. The patient is not nervous/anxious.         Objective       Vital Signs:   /71   Pulse 78   Ht 160 cm (63\")   Wt 102 kg (225 lb 4.8 oz)   SpO2 99%   BMI 39.91 kg/m²     Body mass index is 39.91 kg/m².     PHQ-9 Depression Screening  Little interest or pleasure in doing things?     Feeling down, depressed, or hopeless?     Trouble falling or staying asleep, or sleeping too much?     Feeling tired or having little energy?     Poor appetite or overeating?     Feeling bad about yourself - or that you are a failure or have let yourself or your family down?     Trouble concentrating on things, such as reading the newspaper or watching television?     Moving or speaking so slowly that other people could have noticed? Or the opposite - being so fidgety or restless that you have been moving around a lot more than usual?     Thoughts that you would be better off dead, or of hurting yourself in some way?     PHQ-9 Total Score     If you checked off any problems, how difficult have these problems made it for you to do your work, take care of things at home, or get along with other people?           Physical Exam  Constitutional:       General: She is not in acute distress.  HENT:      Head: Normocephalic and atraumatic.      Nose: No rhinorrhea.      Mouth/Throat:      Mouth: Mucous membranes are moist.   Eyes:      Extraocular Movements: Extraocular movements intact.      Conjunctiva/sclera: Conjunctivae normal.   Neck:      Thyroid: No thyroid " mass.   Cardiovascular:      Rate and Rhythm: Normal rate and regular rhythm.      Pulses: Normal pulses.      Heart sounds: No murmur heard.  Pulmonary:      Effort: No respiratory distress.      Breath sounds: Normal breath sounds.   Abdominal:      General: There is no distension.      Palpations: Abdomen is soft.      Tenderness: There is no abdominal tenderness.   Musculoskeletal:         General: No swelling or tenderness.   Lymphadenopathy:      Cervical: No cervical adenopathy.   Skin:     General: Skin is warm.      Capillary Refill: Capillary refill takes less than 2 seconds.      Findings: No lesion.   Neurological:      General: No focal deficit present.      Mental Status: She is alert.   Psychiatric:         Behavior: Behavior normal.          Result Review  Data Reviewed:{ Labs  Result Review  Imaging  Med Tab  Media :23}                Assessment and Plan {CC Problem List  Visit Diagnosis  ROS  Review (Popup)  Health Maintenance  Quality  BestPractice  Medications  SmartSets  SnapShot Encounters  Media :23}   Diagnoses and all orders for this visit:    1. Well adult exam (Primary)  -     CBC & Differential  -     Comprehensive Metabolic Panel  -     Lipid Panel  -     TSH Rfx On Abnormal To Free T4    2. Obesity (BMI 30-39.9)  -     TSH Rfx On Abnormal To Free T4    3. Herpes labialis  -     valACYclovir (Valtrex) 1000 MG tablet; Two tabs PO Q12 hours for two doses  Dispense: 20 tablet; Refill: 1    4. Fatigue, unspecified type  -     CBC & Differential  -     TSH Rfx On Abnormal To Free T4  -     Vitamin B12    5. Lipid screening  -     Lipid Panel    6. Need for hepatitis C screening test  -     Hepatitis C Antibody        Patient Instructions   Aim for 150 minutes of aerobic exercise weekly.  Continue routine pap smear with gynecologist.  Plan mammogram starting at 40 and colonoscopy starting at age 45 unless there is a change in personal or family history.  You had lab tests  today. You should receive a call or my chart message with your test results. If you have not received your results in the next 7-10 days, please contact the office.    We discussed that for long term success zepbound will likely need to be continued long term. Also if you have a family history of medullary thyroid cancer it would be contraindicated, so please check on that before taking any additional medication.      Routine health maintenance, immunizations, and cancer screenings were discussed and encouraged.     Patient was given instructions and counseling regarding her condition or for health maintenance advice on the AVS.       No follow-ups on file.    Annabelle Bueno MD

## 2024-04-13 LAB
ALBUMIN SERPL-MCNC: 4.7 G/DL (ref 3.5–5.2)
ALBUMIN/GLOB SERPL: 1.7 G/DL
ALP SERPL-CCNC: 81 U/L (ref 39–117)
ALT SERPL-CCNC: 13 U/L (ref 1–33)
AST SERPL-CCNC: 12 U/L (ref 1–32)
BASOPHILS # BLD AUTO: 0.07 10*3/MM3 (ref 0–0.2)
BASOPHILS NFR BLD AUTO: 1.2 % (ref 0–1.5)
BILIRUB SERPL-MCNC: 0.3 MG/DL (ref 0–1.2)
BUN SERPL-MCNC: 11 MG/DL (ref 6–20)
BUN/CREAT SERPL: 11.7 (ref 7–25)
CALCIUM SERPL-MCNC: 9.4 MG/DL (ref 8.6–10.5)
CHLORIDE SERPL-SCNC: 104 MMOL/L (ref 98–107)
CHOLEST SERPL-MCNC: 191 MG/DL (ref 0–200)
CO2 SERPL-SCNC: 22.2 MMOL/L (ref 22–29)
CREAT SERPL-MCNC: 0.94 MG/DL (ref 0.57–1)
EGFRCR SERPLBLD CKD-EPI 2021: 81.8 ML/MIN/1.73
EOSINOPHIL # BLD AUTO: 0.16 10*3/MM3 (ref 0–0.4)
EOSINOPHIL NFR BLD AUTO: 2.6 % (ref 0.3–6.2)
ERYTHROCYTE [DISTWIDTH] IN BLOOD BY AUTOMATED COUNT: 13.1 % (ref 12.3–15.4)
GLOBULIN SER CALC-MCNC: 2.8 GM/DL
GLUCOSE SERPL-MCNC: 81 MG/DL (ref 65–99)
HCT VFR BLD AUTO: 44.1 % (ref 34–46.6)
HCV IGG SERPL QL IA: NON REACTIVE
HDLC SERPL-MCNC: 36 MG/DL (ref 40–60)
HGB BLD-MCNC: 14.8 G/DL (ref 12–15.9)
IMM GRANULOCYTES # BLD AUTO: 0.03 10*3/MM3 (ref 0–0.05)
IMM GRANULOCYTES NFR BLD AUTO: 0.5 % (ref 0–0.5)
LDLC SERPL CALC-MCNC: 140 MG/DL (ref 0–100)
LYMPHOCYTES # BLD AUTO: 1.78 10*3/MM3 (ref 0.7–3.1)
LYMPHOCYTES NFR BLD AUTO: 29.3 % (ref 19.6–45.3)
MCH RBC QN AUTO: 30.7 PG (ref 26.6–33)
MCHC RBC AUTO-ENTMCNC: 33.6 G/DL (ref 31.5–35.7)
MCV RBC AUTO: 91.5 FL (ref 79–97)
MONOCYTES # BLD AUTO: 0.37 10*3/MM3 (ref 0.1–0.9)
MONOCYTES NFR BLD AUTO: 6.1 % (ref 5–12)
NEUTROPHILS # BLD AUTO: 3.66 10*3/MM3 (ref 1.7–7)
NEUTROPHILS NFR BLD AUTO: 60.3 % (ref 42.7–76)
NRBC BLD AUTO-RTO: 0 /100 WBC (ref 0–0.2)
PLATELET # BLD AUTO: 205 10*3/MM3 (ref 140–450)
POTASSIUM SERPL-SCNC: 4.4 MMOL/L (ref 3.5–5.2)
PROT SERPL-MCNC: 7.5 G/DL (ref 6–8.5)
RBC # BLD AUTO: 4.82 10*6/MM3 (ref 3.77–5.28)
SODIUM SERPL-SCNC: 138 MMOL/L (ref 136–145)
TRIGL SERPL-MCNC: 80 MG/DL (ref 0–150)
TSH SERPL DL<=0.005 MIU/L-ACNC: 2.45 UIU/ML (ref 0.27–4.2)
VIT B12 SERPL-MCNC: 469 PG/ML (ref 211–946)
VLDLC SERPL CALC-MCNC: 15 MG/DL (ref 5–40)
WBC # BLD AUTO: 6.07 10*3/MM3 (ref 3.4–10.8)

## 2024-09-17 ENCOUNTER — TELEPHONE (OUTPATIENT)
Dept: FAMILY MEDICINE CLINIC | Facility: CLINIC | Age: 34
End: 2024-09-17

## 2024-09-17 NOTE — TELEPHONE ENCOUNTER
Caller: Nereyda Marin    Relationship: Self    Best call back number: [phone]     What orders are you requesting (i.e. lab or imaging): CELIAC PANEL LABS    In what timeframe would the patient need to come in: ANY     Where will you receive your lab/imaging services: IN OFFICE    Additional notes: PATIENT STATES DAUGHTERS PEDIATRICIAN IS ADVISING HER TO GET THIS DONE BECAUSE CELIAC IS AN AUTOIMMUNE DISEASE AND THEY WANT TO MAKE SURE PATIENTS SON DOES NOT HAVE IT

## 2024-12-27 ENCOUNTER — OFFICE VISIT (OUTPATIENT)
Dept: FAMILY MEDICINE CLINIC | Facility: CLINIC | Age: 34
End: 2024-12-27
Payer: COMMERCIAL

## 2024-12-27 VITALS
DIASTOLIC BLOOD PRESSURE: 77 MMHG | OXYGEN SATURATION: 100 % | WEIGHT: 209.9 LBS | BODY MASS INDEX: 37.19 KG/M2 | RESPIRATION RATE: 16 BRPM | TEMPERATURE: 97.6 F | HEART RATE: 89 BPM | SYSTOLIC BLOOD PRESSURE: 116 MMHG | HEIGHT: 63 IN

## 2024-12-27 DIAGNOSIS — J01.10 ACUTE NON-RECURRENT FRONTAL SINUSITIS: Primary | ICD-10-CM

## 2024-12-27 DIAGNOSIS — Z83.79 FAMILY HISTORY OF CELIAC DISEASE: ICD-10-CM

## 2024-12-27 DIAGNOSIS — K64.9 HEMORRHOIDS, UNSPECIFIED HEMORRHOID TYPE: ICD-10-CM

## 2024-12-27 PROCEDURE — 99214 OFFICE O/P EST MOD 30 MIN: CPT | Performed by: FAMILY MEDICINE

## 2024-12-27 RX ORDER — BENZONATATE 200 MG/1
200 CAPSULE ORAL 3 TIMES DAILY PRN
Qty: 30 CAPSULE | Refills: 0 | Status: SHIPPED | OUTPATIENT
Start: 2024-12-27

## 2024-12-27 RX ORDER — DEXTROMETHORPHAN HYDROBROMIDE AND PROMETHAZINE HYDROCHLORIDE 15; 6.25 MG/5ML; MG/5ML
5 SYRUP ORAL 4 TIMES DAILY PRN
Qty: 240 ML | Refills: 0 | Status: SHIPPED | OUTPATIENT
Start: 2024-12-27

## 2024-12-27 NOTE — PATIENT INSTRUCTIONS
Sinus infection plan:  - Augmentin 2x daily for 7 days. While taking antibiotics you may want to increase your probiotic intake to help minimize stomach upset and diarrhea.  You can increase intake of yogurt, kombucha, kefir, kimchi or other fermented foods, or any probiotic supplement.  - nasal congestion relief with OTC sudafed, mucinex, sinus rinse, use of steam shower or humidifier  - sore throat relief with OTC cough drops, spoonfuls of honey, salt water gargle, throat coat tea, hot honey lemon water  - cough relief with OTC cough drops, honey, use of steam shower or humidifier; RX tessalon perles daytime, promethazine at night  - pain/inflammation relief with OTC ibuprofen 400mg every 4 hrs with food/water, tylenol 1000mg every 6 hrs with food/water  - you may consider taking elderberry syrup or gummies or zinc supplement to boost immune system  - increase hydration  - Red flags: new fever (100.4+), inability to swallow, one sided severe facial pain or dental pain, new shortness of breath, new chest pain not associated with coughing, blood in phlegm    Hemorrhoid plan  - Try OTC preparation H at first, if no improvement in 1-2 weeks, contact PCP for exam  - Diet and lifestyle: Goal is to have at least 1 well formed but soft bowel movement daily that passes without straining or blood.   - Drink 32oz of water daily, more if needed.   - Increase dietary intake of insoluble fiber and/or consider trying metamucil supplement.   -  Don't strain to pass a bowel movement if possible, and avoid prolonged sitting on the toilet.   - Use a short stool or Squatty Potty when having a bowel movement to open the pelvis.

## 2024-12-27 NOTE — PROGRESS NOTES
Chief Complaint  Chief Complaint   Patient presents with    Nasal Congestion     Also presents with severe cough at night. Kids have RSV and pneumonia     Hemorrhoids       Subjective    History of Present Illness  Nereyda Marin is a 34 y.o. female presents to [facility]     History of Present Illness  The patient presents for evaluation of a sinus infection, hemorrhoid, and for celiac disease screening.    She began experiencing symptoms on Monday, initially presenting as a severe sore throat and cough. She reports no fever but describes significant sinus pressure, suggestive of a sinus infection given her medical history. She has been producing yellow sputum in the mornings, with no hemoptysis. Her symptoms are most severe at night, characterized by nasal congestion and difficulty breathing through her nose. She also reports nausea due to the congestion but has not experienced any episodes of vomiting. She experienced pain in the left side of her rib cage last night, which she attributes to coughing, but notes that the pain has lessened today. She has not traveled recently. She has a history of frequent sinus infections, although their frequency has decreased since the birth of her child. She has previously found relief from Tessalon Perles. She has been using a nasal spray for symptom management. She has been managing her symptoms with DayQuil, which has provided minimal relief.    She suspects the presence of a hemorrhoid, as she has noticed blood and discomfort during bowel movements. She can palpate a bump in the area. She does not report any straining or hard bowel movements.    Her daughter was diagnosed with celiac disease in August via blood test, and the doctor recommended that they get tested since it can be genetic. She has not completely eliminated gluten from her diet.    SOCIAL HISTORY  She has 2 children, ages 2 and 5.    FAMILY HISTORY  Her daughter was diagnosed  "with celiac disease in August via blood test.    ALLERGIES  The patient has no known allergies.      Objective   Vitals:    12/27/24 1404   BP: 116/77   BP Location: Right arm   Patient Position: Sitting   Cuff Size: Adult   Pulse: 89   Resp: 16   Temp: 97.6 °F (36.4 °C)   TempSrc: Oral   SpO2: 100%   Weight: 95.2 kg (209 lb 14.4 oz)   Height: 160 cm (63\")     Physical Exam  Constitutional:       General: She is not in acute distress.     Appearance: Normal appearance. She is normal weight. She is not ill-appearing.   HENT:      Head: Normocephalic and atraumatic.      Right Ear: Ear canal and external ear normal.      Left Ear: Ear canal and external ear normal.      Ears:      Comments: Clear fluid bilat TM, no bulge or erythema     Nose: Nose normal. No congestion or rhinorrhea.      Comments: + frontal sinus tenderness to percussion     Mouth/Throat:      Mouth: Mucous membranes are moist.      Pharynx: Posterior oropharyngeal erythema (posterior oropharynx) present. No oropharyngeal exudate.      Comments: + PND  Eyes:      Extraocular Movements: Extraocular movements intact.      Conjunctiva/sclera: Conjunctivae normal.      Pupils: Pupils are equal, round, and reactive to light.   Pulmonary:      Effort: Pulmonary effort is normal. No respiratory distress.      Breath sounds: Normal breath sounds. No wheezing.      Comments: No crackles  Musculoskeletal:         General: Normal range of motion.      Cervical back: Normal range of motion. No rigidity or tenderness.   Lymphadenopathy:      Cervical: Cervical adenopathy present.   Skin:     General: Skin is warm and dry.      Capillary Refill: Capillary refill takes less than 2 seconds.      Findings: No rash.   Neurological:      General: No focal deficit present.      Mental Status: She is alert and oriented to person, place, and time.   Psychiatric:         Mood and Affect: Mood normal.         Behavior: Behavior normal.         Thought Content: Thought " content normal.         Judgment: Judgment normal.        Assessment and Plan  Nereyda Marin is a 34 y.o. female presents to [facility] today    Assessment & Plan  1. Sinus infection.  Symptoms include sore throat, cough, heavy sinus pressure, and yellow mucus production without fever. Physical examination revealed sinus tenderness and congestion. Augmentin twice daily for 7 days is prescribed. Probiotic intake should be increased to reduce stomach upset and diarrhea. Sudafed, Mucinex, and sinus rinse are recommended for nasal congestion. Steam showers and humidifiers may also help. For sore throat, cough drops, honey, saltwater gargle, and tea are suggested. Tessalon Perles during the day and promethazine at night are prescribed for cough relief. Ibuprofen or Tylenol can be used for pain management. Elderberry syrup and zinc are recommended for immune support. Hydration should be increased.    2. Hemorrhoid.  The patient reports blood and discomfort during bowel movements, likely due to a hemorrhoid. Preparation H over-the-counter is recommended. Dietary modifications include increased water intake, insoluble fiber, and Metamucil. Avoid straining and prolonged sitting on the toilet. A stool is recommended to facilitate easier bowel movements. If symptoms do not improve within 1 to 2 weeks, contact Dr. Bueno or return for an examination.    3. Celiac disease screening.  The patient is advised to contact Dr. Bueno for a celiac disease test once her current symptoms have resolved. She should ensure consistent gluten intake for at least a week before the test to avoid false negatives.      Diagnoses and all orders for this visit:    1. Acute non-recurrent frontal sinusitis (Primary)  -     amoxicillin-clavulanate (AUGMENTIN) 875-125 MG per tablet; Take 1 tablet by mouth 2 (Two) Times a Day for 7 days. Indications: Upper Respiratory Tract Infection  Dispense: 14 tablet; Refill:  0  -     benzonatate (TESSALON) 200 MG capsule; Take 1 capsule by mouth 3 (Three) Times a Day As Needed for Cough. Indications: Cough  Dispense: 30 capsule; Refill: 0  -     promethazine-dextromethorphan (PROMETHAZINE-DM) 6.25-15 MG/5ML syrup; Take 5 mL by mouth 4 (Four) Times a Day As Needed for Cough. Indications: Cough  Dispense: 240 mL; Refill: 0    2. Hemorrhoids, unspecified hemorrhoid type    3. Family history of celiac disease        Patient voiced understanding and agreement with plan of care and had no further questions or concerns at this time.     Problems addressed: 1 acute illness with systemic symptoms (MODERATE) new presenting problem: requiring additional assessment, consultation, or diagnostic studies, established problem:  worsening or not responding to treatment  Risk: prescription drug management    Nikky Santo MD  Family Medicine  [facility]      Follow Up  Return if symptoms worsen or fail to improve, for Next scheduled follow up.    Patient Instructions   Sinus infection plan:  - Augmentin 2x daily for 7 days. While taking antibiotics you may want to increase your probiotic intake to help minimize stomach upset and diarrhea.  You can increase intake of yogurt, kombucha, kefir, kimchi or other fermented foods, or any probiotic supplement.  - nasal congestion relief with OTC sudafed, mucinex, sinus rinse, use of steam shower or humidifier  - sore throat relief with OTC cough drops, spoonfuls of honey, salt water gargle, throat coat tea, hot honey lemon water  - cough relief with OTC cough drops, honey, use of steam shower or humidifier; RX tessalon perles daytime, promethazine at night  - pain/inflammation relief with OTC ibuprofen 400mg every 4 hrs with food/water, tylenol 1000mg every 6 hrs with food/water  - you may consider taking elderberry syrup or gummies or zinc supplement to boost immune system  - increase hydration  - Red flags: new fever (100.4+), inability to  swallow, one sided severe facial pain or dental pain, new shortness of breath, new chest pain not associated with coughing, blood in phlegm    Hemorrhoid plan  - Try OTC preparation H at first, if no improvement in 1-2 weeks, contact PCP for exam  - Diet and lifestyle: Goal is to have at least 1 well formed but soft bowel movement daily that passes without straining or blood.   - Drink 32oz of water daily, more if needed.   - Increase dietary intake of insoluble fiber and/or consider trying metamucil supplement.   -  Don't strain to pass a bowel movement if possible, and avoid prolonged sitting on the toilet.   - Use a short stool or Squatty Potty when having a bowel movement to open the pelvis.        Patient or patient representative verbalized consent for the use of Ambient Listening during the visit with  Nikky Santo MD for chart documentation. 12/27/2024  16:52 EST

## 2025-01-02 ENCOUNTER — TELEPHONE (OUTPATIENT)
Dept: FAMILY MEDICINE CLINIC | Facility: CLINIC | Age: 35
End: 2025-01-02
Payer: COMMERCIAL

## 2025-01-02 NOTE — TELEPHONE ENCOUNTER
DELETE AFTER REVIEWING: Send this encounter to the appropriate pool. See your Call Action Grid or Workflows for direction.    Caller: Nereyda Marin    Relationship: Self    Best call back number:  [phone]     What medication are you requesting:      What are your current symptoms:      How long have you been experiencing symptoms:      Have you had these symptoms before:    [] Yes  [] No    Have you been treated for these symptoms before:   [] Yes  [] No    If a prescription is needed, what is your preferred pharmacy and phone number: [id number] [address] AT [address] & [address] - [phone] [facility] [phone]      Additional notes: PATIENT CALLED SHE WAS LAST SEEN ON FRIDAY 12/27/24 AND GIVEN AMOXICILLIN WHICH SHE HAS FINISHED TAKING.  SHE IS STILL HAVING SINUS PRESSURE AND WANTS TO KNOW IF DR SALAZAR CAN CALL IN PAK FOR HER TO THE PHARMACY.

## 2025-04-15 ENCOUNTER — OFFICE VISIT (OUTPATIENT)
Dept: FAMILY MEDICINE CLINIC | Facility: CLINIC | Age: 35
End: 2025-04-15
Payer: COMMERCIAL

## 2025-04-15 VITALS
HEIGHT: 63 IN | SYSTOLIC BLOOD PRESSURE: 102 MMHG | HEART RATE: 75 BPM | WEIGHT: 213.8 LBS | OXYGEN SATURATION: 98 % | DIASTOLIC BLOOD PRESSURE: 71 MMHG | BODY MASS INDEX: 37.88 KG/M2

## 2025-04-15 DIAGNOSIS — B00.1 HERPES LABIALIS: ICD-10-CM

## 2025-04-15 DIAGNOSIS — E66.9 OBESITY (BMI 30-39.9): ICD-10-CM

## 2025-04-15 DIAGNOSIS — Z83.79 FAMILY HISTORY OF CELIAC DISEASE: ICD-10-CM

## 2025-04-15 DIAGNOSIS — Z00.00 WELL ADULT EXAM: Primary | ICD-10-CM

## 2025-04-15 PROCEDURE — 99395 PREV VISIT EST AGE 18-39: CPT | Performed by: FAMILY MEDICINE

## 2025-04-15 RX ORDER — VALACYCLOVIR HYDROCHLORIDE 1 G/1
TABLET, FILM COATED ORAL
Qty: 20 TABLET | Refills: 1 | Status: SHIPPED | OUTPATIENT
Start: 2025-04-15

## 2025-04-15 NOTE — PATIENT INSTRUCTIONS
Aim for 150 minutes of aerobic exercise weekly.  You had lab tests today. You should receive a call or my chart message with your test results. If you have not received your results in the next 7-10 days, please contact the office.    Continue routine pap smear with gynecologist.  Plan mammogram starting at age 40 and colonoscopy starting at age 45 unless change in history.  If you would like to try zepbound again for weight loss, could do karen direct program to make it a little less expensive.

## 2025-04-15 NOTE — PROGRESS NOTES
Chief Complaint  Annual Exam    Subjective    History of Present Illness {CC  Problem List  Visit  Diagnosis   Encounters  Notes  Medications  Labs  Result Review Imaging  Media :23}     Nereyda Marin presents to [facility] for Annual Exam.  She is  and has 2 children--ages 3 and. She sees gynecologist for her pap smears and they have been normal. She has IUD for contraception. She has never had mammogram. She has never had colonoscopy and no family history of colon cancer. She exercises on average 2 times a week. She is active daily with her kids. She has never been a smoker. She is up to date on routine dental and eye exams.    Their chronic medical conditions were reviewed and are stable unless noted otherwise below.  She has a history of chronic reflux since childhood and had dilation in the past. Her daughter has been diagnosed with probable celiac and her child's doctor recommended that parents be checked. She is no longer on medication as she has controlled through diet and lifestyle changes.    She also continues to struggle with her weight, despite continued focusing on healthy diet and exercise. She has taken zepbound in the past and did well and then changed to semaglutide through compound pharmacy due to cost and did not do as well.  She has been off for several weeks and is considering changing medication.         History of Present Illness     Social History     Socioeconomic History    Marital status:     Number of children: 2   Tobacco Use    Smoking status: Never    Smokeless tobacco: Never   Vaping Use    Vaping status: Never Used   Substance and Sexual Activity    Alcohol use: Yes     Alcohol/week: 4.0 standard drinks of alcohol     Types: 4 Shots of liquor per week    Drug use: Never    Sexual activity: Yes     Partners: Male     Birth control/protection: I.U.D.        Review of Systems   Constitutional:  Negative for fatigue.   HENT:   "Negative for ear pain and sore throat.    Eyes:  Negative for pain and visual disturbance.   Respiratory:  Negative for cough and shortness of breath.    Cardiovascular:  Negative for chest pain and palpitations.   Gastrointestinal:  Negative for abdominal pain, constipation and diarrhea.   Genitourinary:  Negative for dysuria.   Musculoskeletal:  Negative for arthralgias.   Skin:  Negative for color change (sees dermatologist).   Allergic/Immunologic: Positive for environmental allergies (seasonal, worse in fall, uses otc as needed).   Neurological:  Negative for dizziness and headaches.   Psychiatric/Behavioral:  Negative for dysphoric mood and suicidal ideas. The patient is not nervous/anxious.         Objective       Vital Signs:   /71   Pulse 75   Ht 160 cm (63\")   Wt 97 kg (213 lb 12.8 oz)   SpO2 98%   BMI 37.87 kg/m²     Body mass index is 37.87 kg/m².     PHQ-9 Depression Screening  Little interest or pleasure in doing things? Not at all   Feeling down, depressed, or hopeless? Not at all   PHQ-2 Total Score 0   Trouble falling or staying asleep, or sleeping too much?     Feeling tired or having little energy?     Poor appetite or overeating?     Feeling bad about yourself - or that you are a failure or have let yourself or your family down?     Trouble concentrating on things, such as reading the newspaper or watching television?     Moving or speaking so slowly that other people could have noticed? Or the opposite - being so fidgety or restless that you have been moving around a lot more than usual?     Thoughts that you would be better off dead, or of hurting yourself in some way?     PHQ-9 Total Score     If you checked off any problems, how difficult have these problems made it for you to do your work, take care of things at home, or get along with other people?        Physical Exam  Constitutional:       General: She is not in acute distress.  HENT:      Head: Normocephalic and atraumatic.    "   Nose: No rhinorrhea.      Mouth/Throat:      Mouth: Mucous membranes are moist.   Eyes:      Conjunctiva/sclera: Conjunctivae normal.   Cardiovascular:      Rate and Rhythm: Normal rate and regular rhythm.      Pulses: Normal pulses.      Heart sounds: No murmur heard.  Pulmonary:      Effort: No respiratory distress.      Breath sounds: Normal breath sounds.   Abdominal:      General: There is no distension.      Palpations: Abdomen is soft.   Musculoskeletal:      Right lower leg: No edema.      Left lower leg: No edema.   Lymphadenopathy:      Cervical: No cervical adenopathy.   Skin:     General: Skin is warm.      Findings: No rash.   Neurological:      General: No focal deficit present.      Mental Status: She is alert.   Psychiatric:         Behavior: Behavior normal.          Result Review  Data Reviewed:{ Labs  Result Review  Imaging  Med Tab  Media :23}                Assessment and Plan {CC Problem List  Visit Diagnosis  ROS  Review (Popup)  Health Maintenance  Quality  BestPractice  Medications  SmartSets  SnapShot Encounters  Media :23}   Diagnoses and all orders for this visit:    1. Well adult exam (Primary)  -     CBC & Differential  -     Comprehensive Metabolic Panel  -     Lipid Panel  -     TSH    2. Herpes labialis  -     valACYclovir (Valtrex) 1000 MG tablet; Two tabs PO Q12 hours for two doses  Dispense: 20 tablet; Refill: 1    3. Obesity (BMI 30-39.9)  -     TSH  -     T4, Free  -     T3, Free    4. Family history of celiac disease  -     Cancel: Celiac Disease Panel  -     Celiac Disease Panel        Patient Instructions   Aim for 150 minutes of aerobic exercise weekly.  You had lab tests today. You should receive a call or my chart message with your test results. If you have not received your results in the next 7-10 days, please contact the office.    Continue routine pap smear with gynecologist.  Plan mammogram starting at age 40 and colonoscopy starting at age 45 unless  change in history.  If you would like to try zepbound again for weight loss, could do karen direct program to make it a little less expensive.      Routine health maintenance, immunizations, and cancer screenings were discussed and encouraged.    Patient was given instructions and counseling regarding her condition or for health maintenance advice on the AVS.       No follow-ups on file.    Annabelle Bueno MD

## 2025-04-16 LAB
ALBUMIN SERPL-MCNC: 4.1 G/DL (ref 3.5–5.2)
ALBUMIN/GLOB SERPL: 1.4 G/DL
ALP SERPL-CCNC: 74 U/L (ref 39–117)
ALT SERPL-CCNC: 16 U/L (ref 1–33)
AST SERPL-CCNC: 16 U/L (ref 1–32)
BASOPHILS # BLD AUTO: 0.03 10*3/MM3 (ref 0–0.2)
BASOPHILS NFR BLD AUTO: 0.8 % (ref 0–1.5)
BILIRUB SERPL-MCNC: 0.4 MG/DL (ref 0–1.2)
BUN SERPL-MCNC: 8 MG/DL (ref 6–20)
BUN/CREAT SERPL: 12.3 (ref 7–25)
CALCIUM SERPL-MCNC: 8.8 MG/DL (ref 8.6–10.5)
CHLORIDE SERPL-SCNC: 105 MMOL/L (ref 98–107)
CHOLEST SERPL-MCNC: 171 MG/DL (ref 0–200)
CO2 SERPL-SCNC: 21.8 MMOL/L (ref 22–29)
CREAT SERPL-MCNC: 0.65 MG/DL (ref 0.57–1)
EGFRCR SERPLBLD CKD-EPI 2021: 117.9 ML/MIN/1.73
ENDOMYSIUM IGA SER QL: NEGATIVE
EOSINOPHIL # BLD AUTO: 0.17 10*3/MM3 (ref 0–0.4)
EOSINOPHIL NFR BLD AUTO: 4.3 % (ref 0.3–6.2)
ERYTHROCYTE [DISTWIDTH] IN BLOOD BY AUTOMATED COUNT: 13.2 % (ref 12.3–15.4)
GLOBULIN SER CALC-MCNC: 2.9 GM/DL
GLUCOSE SERPL-MCNC: 81 MG/DL (ref 65–99)
HCT VFR BLD AUTO: 42 % (ref 34–46.6)
HDLC SERPL-MCNC: 45 MG/DL (ref 40–60)
HGB BLD-MCNC: 13.8 G/DL (ref 12–15.9)
IGA SERPL-MCNC: 320 MG/DL (ref 87–352)
IMM GRANULOCYTES # BLD AUTO: 0.01 10*3/MM3 (ref 0–0.05)
IMM GRANULOCYTES NFR BLD AUTO: 0.3 % (ref 0–0.5)
LDLC SERPL CALC-MCNC: 111 MG/DL (ref 0–100)
LYMPHOCYTES # BLD AUTO: 1.41 10*3/MM3 (ref 0.7–3.1)
LYMPHOCYTES NFR BLD AUTO: 35.6 % (ref 19.6–45.3)
MCH RBC QN AUTO: 31.1 PG (ref 26.6–33)
MCHC RBC AUTO-ENTMCNC: 32.9 G/DL (ref 31.5–35.7)
MCV RBC AUTO: 94.6 FL (ref 79–97)
MONOCYTES # BLD AUTO: 0.34 10*3/MM3 (ref 0.1–0.9)
MONOCYTES NFR BLD AUTO: 8.6 % (ref 5–12)
NEUTROPHILS # BLD AUTO: 2 10*3/MM3 (ref 1.7–7)
NEUTROPHILS NFR BLD AUTO: 50.4 % (ref 42.7–76)
NRBC BLD AUTO-RTO: 0 /100 WBC (ref 0–0.2)
PLATELET # BLD AUTO: 181 10*3/MM3 (ref 140–450)
POTASSIUM SERPL-SCNC: 4.1 MMOL/L (ref 3.5–5.2)
PROT SERPL-MCNC: 7 G/DL (ref 6–8.5)
RBC # BLD AUTO: 4.44 10*6/MM3 (ref 3.77–5.28)
SODIUM SERPL-SCNC: 139 MMOL/L (ref 136–145)
T3FREE SERPL-MCNC: 3 PG/ML (ref 2–4.4)
T4 FREE SERPL-MCNC: 1.02 NG/DL (ref 0.92–1.68)
TRIGL SERPL-MCNC: 82 MG/DL (ref 0–150)
TSH SERPL DL<=0.005 MIU/L-ACNC: 2.08 UIU/ML (ref 0.27–4.2)
TTG IGA SER-ACNC: <2 U/ML (ref 0–3)
VLDLC SERPL CALC-MCNC: 15 MG/DL (ref 5–40)
WBC # BLD AUTO: 3.96 10*3/MM3 (ref 3.4–10.8)

## 2025-08-22 ENCOUNTER — APPOINTMENT (OUTPATIENT)
Dept: CT IMAGING | Facility: HOSPITAL | Age: 35
End: 2025-08-22
Payer: COMMERCIAL

## 2025-08-22 ENCOUNTER — TELEPHONE (OUTPATIENT)
Dept: FAMILY MEDICINE CLINIC | Facility: CLINIC | Age: 35
End: 2025-08-22
Payer: COMMERCIAL

## 2025-08-22 ENCOUNTER — HOSPITAL ENCOUNTER (EMERGENCY)
Facility: HOSPITAL | Age: 35
Discharge: HOME | End: 2025-08-22
Attending: EMERGENCY MEDICINE
Payer: COMMERCIAL